# Patient Record
Sex: FEMALE | URBAN - METROPOLITAN AREA
[De-identification: names, ages, dates, MRNs, and addresses within clinical notes are randomized per-mention and may not be internally consistent; named-entity substitution may affect disease eponyms.]

---

## 2022-10-18 ENCOUNTER — OFFICE VISIT (OUTPATIENT)
Dept: URBAN - METROPOLITAN AREA CLINIC 71 | Facility: CLINIC | Age: 47
End: 2022-10-18
Payer: COMMERCIAL

## 2022-10-18 DIAGNOSIS — E05.01 THYROTOXICOSIS W DIFFUSE GOITER W THYROTOXIC CRISIS OR STORM: Primary | ICD-10-CM

## 2022-10-18 PROCEDURE — 99202 OFFICE O/P NEW SF 15 MIN: CPT | Performed by: OPHTHALMOLOGY

## 2022-10-18 ASSESSMENT — INTRAOCULAR PRESSURE
OD: 16
OS: 21

## 2022-10-18 NOTE — IMPRESSION/PLAN
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
exophthalmometer at 109 OD: 25
OS: 26 Discussed with patient. Plan: No recommendation for any surgical treatment. Will continue to monitor for changes. Return in 6 months for a recheck and will redo the measurements.

## 2023-01-27 ENCOUNTER — OFFICE VISIT (OUTPATIENT)
Dept: URBAN - METROPOLITAN AREA CLINIC 71 | Facility: CLINIC | Age: 48
End: 2023-01-27
Payer: COMMERCIAL

## 2023-01-27 DIAGNOSIS — E05.01 THYROTOXICOSIS W DIFFUSE GOITER W THYROTOXIC CRISIS OR STORM: ICD-10-CM

## 2023-01-27 DIAGNOSIS — H40.013 OPEN ANGLE WITH BORDERLINE FINDINGS, LOW RISK, BILATERAL: Primary | ICD-10-CM

## 2023-01-27 PROCEDURE — 99213 OFFICE O/P EST LOW 20 MIN: CPT | Performed by: OPHTHALMOLOGY

## 2023-01-27 PROCEDURE — 92133 CPTRZD OPH DX IMG PST SGM ON: CPT | Performed by: OPHTHALMOLOGY

## 2023-01-27 ASSESSMENT — INTRAOCULAR PRESSURE
OD: 25
OS: 25
OD: 18
OS: 19

## 2023-01-27 NOTE — IMPRESSION/PLAN
Impression: Open angle with borderline findings, low risk, bilateral: H40.013. IOP is elevated. Patient is a glaucoma suspect due to the IOP. Discussed diagnosis. Ordered baseline OCT testing. Plan: No recommendation for drops at this time. Return as scheduled in April, but add VF testing.

## 2023-01-27 NOTE — IMPRESSION/PLAN
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 Plan: No recommendation for any surgical treatment. Return as scheduled for a recheck.

## 2023-04-21 ENCOUNTER — OFFICE VISIT (OUTPATIENT)
Dept: URBAN - METROPOLITAN AREA CLINIC 71 | Facility: CLINIC | Age: 48
End: 2023-04-21
Payer: COMMERCIAL

## 2023-04-21 DIAGNOSIS — H40.013 OPEN ANGLE WITH BORDERLINE FINDINGS, LOW RISK, BILATERAL: Primary | ICD-10-CM

## 2023-04-21 DIAGNOSIS — E05.01 THYROTOXICOSIS W DIFFUSE GOITER W THYROTOXIC CRISIS OR STORM: ICD-10-CM

## 2023-04-21 PROCEDURE — 92083 EXTENDED VISUAL FIELD XM: CPT | Performed by: OPHTHALMOLOGY

## 2023-04-21 PROCEDURE — 99212 OFFICE O/P EST SF 10 MIN: CPT | Performed by: OPHTHALMOLOGY

## 2023-04-21 ASSESSMENT — INTRAOCULAR PRESSURE
OS: 26
OD: 21
OD: 27

## 2023-04-21 NOTE — IMPRESSION/PLAN
Impression: Open angle with borderline findings, low risk, bilateral: H40.013. VF ordered and reviewed today. VF WNL OU. IOP is remaining elevated but not to the point treatment is recommended. Patient is a glaucoma suspect due to the IOP. Discussed diagnosis. Plan: No recommendation for drops at this time. Return in 4 months for IOP check and OPTOS.